# Patient Record
Sex: MALE | Race: WHITE | URBAN - METROPOLITAN AREA
[De-identification: names, ages, dates, MRNs, and addresses within clinical notes are randomized per-mention and may not be internally consistent; named-entity substitution may affect disease eponyms.]

---

## 2018-03-09 ENCOUNTER — APPOINTMENT (RX ONLY)
Dept: URBAN - METROPOLITAN AREA CLINIC 97 | Facility: CLINIC | Age: 83
Setting detail: DERMATOLOGY
End: 2018-03-09

## 2018-03-09 DIAGNOSIS — L85.3 XEROSIS CUTIS: ICD-10-CM

## 2018-03-09 DIAGNOSIS — L57.0 ACTINIC KERATOSIS: ICD-10-CM

## 2018-03-09 DIAGNOSIS — D485 NEOPLASM OF UNCERTAIN BEHAVIOR OF SKIN: ICD-10-CM

## 2018-03-09 PROBLEM — D48.5 NEOPLASM OF UNCERTAIN BEHAVIOR OF SKIN: Status: ACTIVE | Noted: 2018-03-09

## 2018-03-09 PROBLEM — I10 ESSENTIAL (PRIMARY) HYPERTENSION: Status: ACTIVE | Noted: 2018-03-09

## 2018-03-09 PROBLEM — Z85.828 PERSONAL HISTORY OF OTHER MALIGNANT NEOPLASM OF SKIN: Status: ACTIVE | Noted: 2018-03-09

## 2018-03-09 PROBLEM — N40.0 BENIGN PROSTATIC HYPERPLASIA WITHOUT LOWER URINARY TRACT SYMPTOMS: Status: ACTIVE | Noted: 2018-03-09

## 2018-03-09 PROCEDURE — ? LIQUID NITROGEN

## 2018-03-09 PROCEDURE — 17000 DESTRUCT PREMALG LESION: CPT

## 2018-03-09 PROCEDURE — ? PRESCRIPTION

## 2018-03-09 PROCEDURE — 11100: CPT | Mod: 59

## 2018-03-09 PROCEDURE — 99213 OFFICE O/P EST LOW 20 MIN: CPT | Mod: 25

## 2018-03-09 PROCEDURE — ? TREATMENT REGIMEN

## 2018-03-09 PROCEDURE — 11101: CPT

## 2018-03-09 PROCEDURE — 17003 DESTRUCT PREMALG LES 2-14: CPT

## 2018-03-09 PROCEDURE — ? BIOPSY BY SHAVE METHOD

## 2018-03-09 RX ORDER — AMMONIUM LACTATE 5 %
LOTION (GRAM) TOPICAL
Qty: 1 | Refills: 2

## 2018-03-09 ASSESSMENT — LOCATION ZONE DERM
LOCATION ZONE: ARM
LOCATION ZONE: TRUNK
LOCATION ZONE: FACE

## 2018-03-09 ASSESSMENT — LOCATION DETAILED DESCRIPTION DERM
LOCATION DETAILED: LEFT SUPERIOR FOREHEAD
LOCATION DETAILED: LEFT PROXIMAL ULNAR DORSAL FOREARM
LOCATION DETAILED: RIGHT INFERIOR CENTRAL MALAR CHEEK
LOCATION DETAILED: LEFT INFERIOR MEDIAL LOWER BACK
LOCATION DETAILED: RIGHT DISTAL RADIAL DORSAL FOREARM
LOCATION DETAILED: LEFT PROXIMAL DORSAL FOREARM
LOCATION DETAILED: RIGHT INFERIOR MEDIAL UPPER BACK
LOCATION DETAILED: RIGHT PROXIMAL DORSAL FOREARM
LOCATION DETAILED: RIGHT CENTRAL MALAR CHEEK
LOCATION DETAILED: LEFT DISTAL DORSAL FOREARM
LOCATION DETAILED: RIGHT FOREHEAD

## 2018-03-09 ASSESSMENT — LOCATION SIMPLE DESCRIPTION DERM
LOCATION SIMPLE: RIGHT FOREHEAD
LOCATION SIMPLE: LEFT FOREARM
LOCATION SIMPLE: LEFT LOWER BACK
LOCATION SIMPLE: LEFT FOREHEAD
LOCATION SIMPLE: RIGHT CHEEK
LOCATION SIMPLE: RIGHT FOREARM
LOCATION SIMPLE: RIGHT UPPER BACK

## 2018-03-09 NOTE — PROCEDURE: MIPS QUALITY
Quality 111:Pneumonia Vaccination Status For Older Adults: Pneumococcal Vaccination not Administered or Previously Received, Reason not Otherwise Specified
Quality 110: Preventive Care And Screening: Influenza Immunization: Influenza Immunization previously received during influenza season
Detail Level: Zone

## 2018-03-09 NOTE — PROCEDURE: BIOPSY BY SHAVE METHOD
Anesthesia Type: 1% lidocaine with epinephrine
Biopsy Method: sterile single edge surgical blade
Hemostasis: Asif's
Dressing: bandage
Wound Care: Petrolatum
Bill For Surgical Tray: no
Additional Anesthesia Volume In Cc (Will Not Render If 0): 0
Post-Care Instructions: I reviewed with the patient in detail post-care instructions. Patient is to keep the biopsy site dry overnight, and then apply bacitracin twice daily until healed. Patient may apply hydrogen peroxide soaks to remove any crusting.
Detail Level: Simple
Electrodesiccation And Curettage Text: The wound bed was treated with electrodesiccation and curettage after the biopsy was performed.
Type Of Destruction Used: Curettage
Billing Type: [employer]
Lab: [id number] [facility] [address]
Notification Instructions: Patient will be notified of biopsy results. However, patient instructed to call the office if not contacted within 2 weeks.
Consent: Written consent was obtained and risks were reviewed including but not limited to scarring, infection, bleeding, scabbing, incomplete removal, nerve damage and allergy to anesthesia.
Electrodesiccation Text: The wound bed was treated with electrodesiccation after the biopsy was performed.
Anesthesia Volume In Cc (Will Not Render If 0): 0.5
Body Location Override (Optional - Billing Will Still Be Based On Selected Body Map Location If Applicable): Left Lower Back
Silver Nitrate Text: The wound bed was treated with silver nitrate after the biopsy was performed.
Biopsy Type: H and E
Lab: [id number]
Body Location Override (Optional - Billing Will Still Be Based On Selected Body Map Location If Applicable): Mid Back
Billing Type: Third-Party Bill

## 2018-09-07 ENCOUNTER — APPOINTMENT (RX ONLY)
Dept: URBAN - METROPOLITAN AREA CLINIC 97 | Facility: CLINIC | Age: 83
Setting detail: DERMATOLOGY
End: 2018-09-07

## 2018-09-07 DIAGNOSIS — D485 NEOPLASM OF UNCERTAIN BEHAVIOR OF SKIN: ICD-10-CM

## 2018-09-07 DIAGNOSIS — L82.1 OTHER SEBORRHEIC KERATOSIS: ICD-10-CM

## 2018-09-07 DIAGNOSIS — L85.3 XEROSIS CUTIS: ICD-10-CM

## 2018-09-07 DIAGNOSIS — L57.0 ACTINIC KERATOSIS: ICD-10-CM

## 2018-09-07 DIAGNOSIS — L81.4 OTHER MELANIN HYPERPIGMENTATION: ICD-10-CM

## 2018-09-07 PROBLEM — D48.5 NEOPLASM OF UNCERTAIN BEHAVIOR OF SKIN: Status: ACTIVE | Noted: 2018-09-07

## 2018-09-07 PROCEDURE — ? LIQUID NITROGEN

## 2018-09-07 PROCEDURE — 99213 OFFICE O/P EST LOW 20 MIN: CPT | Mod: 25

## 2018-09-07 PROCEDURE — 17000 DESTRUCT PREMALG LESION: CPT

## 2018-09-07 PROCEDURE — ? BIOPSY BY SHAVE METHOD

## 2018-09-07 PROCEDURE — 17003 DESTRUCT PREMALG LES 2-14: CPT

## 2018-09-07 PROCEDURE — 11100: CPT | Mod: 59

## 2018-09-07 PROCEDURE — ? COUNSELING

## 2018-09-07 PROCEDURE — 11101: CPT

## 2018-09-07 PROCEDURE — ? TREATMENT REGIMEN

## 2018-09-07 ASSESSMENT — LOCATION DETAILED DESCRIPTION DERM
LOCATION DETAILED: LEFT PROXIMAL PRETIBIAL REGION
LOCATION DETAILED: RIGHT ANTERIOR DISTAL THIGH
LOCATION DETAILED: SUPERIOR THORACIC SPINE
LOCATION DETAILED: LEFT ANTERIOR DISTAL UPPER ARM
LOCATION DETAILED: RIGHT ANTERIOR SHOULDER
LOCATION DETAILED: RIGHT PROXIMAL DORSAL FOREARM
LOCATION DETAILED: LEFT ANTERIOR PROXIMAL UPPER ARM
LOCATION DETAILED: RIGHT DISTAL PRETIBIAL REGION
LOCATION DETAILED: EPIGASTRIC SKIN
LOCATION DETAILED: RIGHT DISTAL POSTERIOR UPPER ARM
LOCATION DETAILED: INFERIOR THORACIC SPINE
LOCATION DETAILED: LEFT ANTERIOR DISTAL THIGH
LOCATION DETAILED: LEFT PROXIMAL DORSAL FOREARM
LOCATION DETAILED: RIGHT FOREHEAD
LOCATION DETAILED: LEFT DISTAL DORSAL FOREARM
LOCATION DETAILED: RIGHT MEDIAL FOREHEAD
LOCATION DETAILED: RIGHT SUPERIOR LATERAL MALAR CHEEK
LOCATION DETAILED: RIGHT DISTAL DORSAL FOREARM

## 2018-09-07 ASSESSMENT — LOCATION SIMPLE DESCRIPTION DERM
LOCATION SIMPLE: RIGHT CHEEK
LOCATION SIMPLE: LEFT FOREARM
LOCATION SIMPLE: LEFT PRETIBIAL REGION
LOCATION SIMPLE: LEFT UPPER ARM
LOCATION SIMPLE: LEFT THIGH
LOCATION SIMPLE: RIGHT PRETIBIAL REGION
LOCATION SIMPLE: RIGHT FOREARM
LOCATION SIMPLE: UPPER BACK
LOCATION SIMPLE: ABDOMEN
LOCATION SIMPLE: RIGHT UPPER ARM
LOCATION SIMPLE: RIGHT SHOULDER
LOCATION SIMPLE: RIGHT THIGH
LOCATION SIMPLE: RIGHT FOREHEAD

## 2018-09-07 ASSESSMENT — LOCATION ZONE DERM
LOCATION ZONE: TRUNK
LOCATION ZONE: ARM
LOCATION ZONE: FACE
LOCATION ZONE: LEG

## 2018-09-07 NOTE — PROCEDURE: BIOPSY BY SHAVE METHOD
Electrodesiccation And Curettage Text: The wound bed was treated with electrodesiccation and curettage after the biopsy was performed.
Render Post-Care Instructions In Note?: no
Was A Bandage Applied: Yes
Notification Instructions: Patient will be notified of biopsy results. However, patient instructed to call the office if not contacted within 2 weeks.
Dressing: bandage
X Size Of Lesion In Cm: 0
Biopsy Type: H and E
Electrodesiccation Text: The wound bed was treated with electrodesiccation after the biopsy was performed.
Biopsy Method: sterile single edge surgical blade
Wound Care: Petrolatum
Lab: [id number] [facility] [address]
Consent: Written consent was obtained and risks were reviewed including but not limited to scarring, infection, bleeding, scabbing, incomplete removal, nerve damage and allergy to anesthesia.
Silver Nitrate Text: The wound bed was treated with silver nitrate after the biopsy was performed.
Hemostasis: Asif's
Anesthesia Type: 1% lidocaine with epinephrine
Type Of Destruction Used: Curettage
Post-Care Instructions: I reviewed with the patient in detail post-care instructions. Patient is to keep the biopsy site dry overnight, and then apply bacitracin twice daily until healed. Patient may apply hydrogen peroxide soaks to remove any crusting.
Body Location Override (Optional - Billing Will Still Be Based On Selected Body Map Location If Applicable): Right Sideburn
Detail Level: Simple
Anesthesia Volume In Cc (Will Not Render If 0): 0.5
Depth Of Biopsy: dermis
Billing Type: [employer]
Body Location Override (Optional - Billing Will Still Be Based On Selected Body Map Location If Applicable): Left Arm
Lab: [id number]
Billing Type: Third-Party Bill

## 2019-01-01 ENCOUNTER — APPOINTMENT (RX ONLY)
Dept: URBAN - METROPOLITAN AREA CLINIC 97 | Facility: CLINIC | Age: 84
Setting detail: DERMATOLOGY
End: 2019-01-01

## 2019-01-01 DIAGNOSIS — L82.1 OTHER SEBORRHEIC KERATOSIS: ICD-10-CM

## 2019-01-01 DIAGNOSIS — L57.0 ACTINIC KERATOSIS: ICD-10-CM

## 2019-01-01 DIAGNOSIS — L81.4 OTHER MELANIN HYPERPIGMENTATION: ICD-10-CM

## 2019-01-01 DIAGNOSIS — L85.3 XEROSIS CUTIS: ICD-10-CM

## 2019-01-01 DIAGNOSIS — D485 NEOPLASM OF UNCERTAIN BEHAVIOR OF SKIN: ICD-10-CM

## 2019-01-01 PROCEDURE — ? LIQUID NITROGEN

## 2019-01-01 PROCEDURE — 17003 DESTRUCT PREMALG LES 2-14: CPT

## 2019-01-01 PROCEDURE — ? TREATMENT REGIMEN

## 2019-01-01 PROCEDURE — 17000 DESTRUCT PREMALG LESION: CPT | Mod: 59

## 2019-01-01 PROCEDURE — ? CURETTAGE AND DESTRUCTION

## 2019-01-01 PROCEDURE — 11103 TANGNTL BX SKIN EA SEP/ADDL: CPT

## 2019-01-01 PROCEDURE — 11102 TANGNTL BX SKIN SINGLE LES: CPT

## 2019-01-01 PROCEDURE — 99214 OFFICE O/P EST MOD 30 MIN: CPT | Mod: 25

## 2019-01-01 PROCEDURE — ? BIOPSY BY SHAVE METHOD

## 2019-01-01 PROCEDURE — 17262 DSTRJ MAL LES T/A/L 1.1-2.0: CPT

## 2019-01-01 PROCEDURE — 17272 DSTR MAL LES S/N/H/F/G 1.1-2: CPT

## 2019-01-01 PROCEDURE — ? COUNSELING

## 2019-01-01 PROCEDURE — 99213 OFFICE O/P EST LOW 20 MIN: CPT | Mod: 25

## 2019-01-01 ASSESSMENT — LOCATION DETAILED DESCRIPTION DERM
LOCATION DETAILED: LEFT ANTERIOR PROXIMAL UPPER ARM
LOCATION DETAILED: LEFT PROXIMAL PRETIBIAL REGION
LOCATION DETAILED: RIGHT ULNAR DORSAL HAND
LOCATION DETAILED: RIGHT ANTERIOR SHOULDER
LOCATION DETAILED: LEFT INFERIOR CENTRAL MALAR CHEEK
LOCATION DETAILED: LEFT CENTRAL MALAR CHEEK
LOCATION DETAILED: LEFT RADIAL DORSAL HAND
LOCATION DETAILED: LEFT ANTERIOR DISTAL THIGH
LOCATION DETAILED: LEFT DISTAL PRETIBIAL REGION
LOCATION DETAILED: SUPERIOR THORACIC SPINE
LOCATION DETAILED: LEFT MEDIAL SUPERIOR CHEST
LOCATION DETAILED: LEFT ULNAR DORSAL HAND
LOCATION DETAILED: LEFT SUPERIOR LATERAL UPPER BACK
LOCATION DETAILED: LEFT PROXIMAL DORSAL FOREARM
LOCATION DETAILED: RIGHT DISTAL PRETIBIAL REGION
LOCATION DETAILED: RIGHT ANTERIOR DISTAL THIGH
LOCATION DETAILED: RIGHT PROXIMAL DORSAL FOREARM
LOCATION DETAILED: INFERIOR THORACIC SPINE
LOCATION DETAILED: EPIGASTRIC SKIN
LOCATION DETAILED: LEFT CENTRAL MALAR CHEEK

## 2019-01-01 ASSESSMENT — LOCATION ZONE DERM
LOCATION ZONE: LEG
LOCATION ZONE: FACE
LOCATION ZONE: HAND
LOCATION ZONE: TRUNK
LOCATION ZONE: ARM
LOCATION ZONE: FACE
LOCATION ZONE: TRUNK
LOCATION ZONE: HAND

## 2019-01-01 ASSESSMENT — LOCATION SIMPLE DESCRIPTION DERM
LOCATION SIMPLE: LEFT HAND
LOCATION SIMPLE: UPPER BACK
LOCATION SIMPLE: LEFT UPPER BACK
LOCATION SIMPLE: RIGHT HAND
LOCATION SIMPLE: RIGHT SHOULDER
LOCATION SIMPLE: RIGHT FOREARM
LOCATION SIMPLE: LEFT HAND
LOCATION SIMPLE: LEFT THIGH
LOCATION SIMPLE: LEFT PRETIBIAL REGION
LOCATION SIMPLE: CHEST
LOCATION SIMPLE: LEFT UPPER ARM
LOCATION SIMPLE: LEFT CHEEK
LOCATION SIMPLE: LEFT FOREARM
LOCATION SIMPLE: ABDOMEN
LOCATION SIMPLE: RIGHT PRETIBIAL REGION
LOCATION SIMPLE: LEFT CHEEK
LOCATION SIMPLE: RIGHT THIGH

## 2019-03-08 PROBLEM — L81.4 OTHER MELANIN HYPERPIGMENTATION: Status: ACTIVE | Noted: 2019-01-01

## 2019-03-08 PROBLEM — D48.5 NEOPLASM OF UNCERTAIN BEHAVIOR OF SKIN: Status: ACTIVE | Noted: 2019-01-01

## 2019-03-08 PROBLEM — L85.3 XEROSIS CUTIS: Status: ACTIVE | Noted: 2019-01-01

## 2019-03-08 PROBLEM — L57.0 ACTINIC KERATOSIS: Status: ACTIVE | Noted: 2019-01-01

## 2019-03-08 PROBLEM — L82.1 OTHER SEBORRHEIC KERATOSIS: Status: ACTIVE | Noted: 2019-01-01

## 2019-03-08 NOTE — PROCEDURE: BIOPSY BY SHAVE METHOD
Electrodesiccation Text: The wound bed was treated with electrodesiccation after the biopsy was performed.
Size Of Lesion In Cm: 0
Type Of Destruction Used: Curettage
Render Post-Care Instructions In Note?: no
Depth Of Biopsy: dermis
Dressing: bandage
Body Location Override (Optional - Billing Will Still Be Based On Selected Body Map Location If Applicable): Left Hand
Biopsy Type: H and E
Electrodesiccation And Curettage Text: The wound bed was treated with electrodesiccation and curettage after the biopsy was performed.
Hemostasis: Asif's
Anesthesia Type: 1% lidocaine with epinephrine
Billing Type: [employer]
Silver Nitrate Text: The wound bed was treated with silver nitrate after the biopsy was performed.
Detail Level: Simple
Post-Care Instructions: I reviewed with the patient in detail post-care instructions. Patient is to keep the biopsy site dry overnight, and then apply bacitracin twice daily until healed. Patient may apply hydrogen peroxide soaks to remove any crusting.
Lab: [id number] [facility] [address]
Wound Care: Petrolatum
Was A Bandage Applied: Yes
Biopsy Method: sterile single edge surgical blade
Consent: The provider's intent is to obtain a tissue sample solely for diagnostic purposes. Written consent was obtained and risks were reviewed including but not limited to scarring, infection, bleeding, scabbing, incomplete removal, nerve damage and allergy to anesthesia.
Notification Instructions: Patient will be notified of biopsy results. However, patient instructed to call the office if not contacted within 2 weeks.
Anesthesia Volume In Cc (Will Not Render If 0): 0.5
Lab: [id number]
Body Location Override (Optional - Billing Will Still Be Based On Selected Body Map Location If Applicable): Mid Upper Back
Billing Type: Third-Party Bill
Body Location Override (Optional - Billing Will Still Be Based On Selected Body Map Location If Applicable): Left Upper Arm

## 2019-03-08 NOTE — PROCEDURE: MIPS QUALITY
Detail Level: Zone
Quality 474: Zoster Vaccination Status: Shingrix Vaccination not Administered or Previously Received, Reason not Otherwise Specified
Quality 130: Documentation Of Current Medications In The Medical Record: Current Medications Documented

## 2019-03-22 PROBLEM — C44.629 SQUAMOUS CELL CARCINOMA OF SKIN OF LEFT UPPER LIMB, INCLUDING SHOULDER: Status: ACTIVE | Noted: 2019-01-01

## 2019-03-22 NOTE — PROCEDURE: CURETTAGE AND DESTRUCTION
Size Of Lesion After Curettage: 1.8
Total Volume (Ccs): 1
Additional Information: (Optional): The wound was cleaned, and a pressure dressing was applied. The patient received detailed post-op instructions.
Consent was obtained from the patient. The risks, benefits and alternatives to therapy were discussed in detail. Specifically, the risks of infection, scarring, bleeding, prolonged wound healing, nerve injury, incomplete removal, allergy to anesthesia and recurrence were addressed. Alternatives to [facility], such as: surgical removal and XRT were also discussed. Prior to the procedure, the treatment site was clearly identified and confirmed by the patient. All components of Universal Protocol/PAUSE Rule completed.
Body Location Override (Optional - Billing Will Still Be Based On Selected Body Map Location If Applicable): left hand
Bill For Surgical Tray: no
Number Of Curettages: 3
Cautery Type: electrodesiccation
Render Post-Care Instructions In Note?: yes
Detail Level: Detailed
Post-Care Instructions: I reviewed with the patient in detail post-care instructions. Patient is to keep the area dry for 48 hours, and not to engage in any swimming until the area is healed. Should the patient develop any fevers, chills, bleeding, severe pain patient will contact the office immediately.
Size Of Lesion In Cm: 1.5
What Was Performed First?: Curettage
Anesthesia Type: 1% lidocaine with epinephrine
Bill As A Line Item Or As Units: Line Item

## 2019-03-22 NOTE — PROCEDURE: MIPS QUALITY
Quality 130: Documentation Of Current Medications In The Medical Record: Current Medications Documented
Quality 131: Pain Assessment And Follow-Up: Pain assessment using a standardized tool is documented as negative, no follow-up plan required
Quality 265: Biopsy Follow-Up: Biopsy results reviewed, communicated, tracked, and documented
Detail Level: Zone
Additional Notes: 0/10

## 2019-09-06 PROBLEM — L82.1 OTHER SEBORRHEIC KERATOSIS: Status: ACTIVE | Noted: 2019-01-01

## 2019-09-06 PROBLEM — D48.5 NEOPLASM OF UNCERTAIN BEHAVIOR OF SKIN: Status: ACTIVE | Noted: 2019-01-01

## 2019-09-06 PROBLEM — L81.4 OTHER MELANIN HYPERPIGMENTATION: Status: ACTIVE | Noted: 2019-01-01

## 2019-09-06 PROBLEM — L57.0 ACTINIC KERATOSIS: Status: ACTIVE | Noted: 2019-01-01

## 2019-09-06 NOTE — PROCEDURE: LIQUID NITROGEN
Render Post-Care Instructions In Note?: yes
Post-Care Instructions: I reviewed with the patient in detail post-care instructions. Patient is to wear sunprotection, and avoid picking at any of the treated lesions. Pt may apply Vaseline to crusted or scabbing areas.
Render Note In Bullet Format When Appropriate: No
Detail Level: Detailed
Consent: The patient's consent was obtained including but not limited to risks of crusting, scabbing, blistering, scarring, darker or lighter pigmentary change, recurrence, incomplete removal and infection.
Duration Of Freeze Thaw-Cycle (Seconds): 3

## 2019-09-06 NOTE — PROCEDURE: MIPS QUALITY
Detail Level: Zone
Quality 130: Documentation Of Current Medications In The Medical Record: Current Medications Documented
Quality 131: Pain Assessment And Follow-Up: Pain assessment using a standardized tool is documented as negative, no follow-up plan required
Additional Notes: Patient states negative pain 0/10

## 2019-09-27 PROBLEM — C44.529 SQUAMOUS CELL CARCINOMA OF SKIN OF OTHER PART OF TRUNK: Status: ACTIVE | Noted: 2019-01-01

## 2019-09-27 NOTE — PROCEDURE: CURETTAGE AND DESTRUCTION
Bill For Surgical Tray: no
Total Volume (Ccs): 1
Consent was obtained from the patient. The risks, benefits and alternatives to therapy were discussed in detail. Specifically, the risks of infection, scarring, bleeding, prolonged wound healing, nerve injury, incomplete removal, allergy to anesthesia and recurrence were addressed. Alternatives to [facility], such as: surgical removal and XRT were also discussed. Prior to the procedure, the treatment site was clearly identified and confirmed by the patient. All components of Universal Protocol/PAUSE Rule completed.
What Was Performed First?: Curettage
Additional Information: (Optional): The wound was cleaned, and a pressure dressing was applied. The patient received detailed post-op instructions.
Anesthesia Volume In Cc: 0.5
Anesthesia Type: 1% lidocaine with epinephrine
Detail Level: Detailed
Size Of Lesion In Cm: 0.9
Post-Care Instructions: I reviewed with the patient in detail post-care instructions. Patient is to keep the area dry for 48 hours, and not to engage in any swimming until the area is healed. Should the patient develop any fevers, chills, bleeding, severe pain patient will contact the office immediately.
Render Post-Care Instructions In Note?: yes
Number Of Curettages: 3
Cautery Type: Monsel's solution
Bill As A Line Item Or As Units: Line Item
Size Of Lesion After Curettage: 1.2
